# Patient Record
Sex: FEMALE | Race: BLACK OR AFRICAN AMERICAN | NOT HISPANIC OR LATINO
[De-identification: names, ages, dates, MRNs, and addresses within clinical notes are randomized per-mention and may not be internally consistent; named-entity substitution may affect disease eponyms.]

---

## 2019-09-26 VITALS
TEMPERATURE: 98 F | HEART RATE: 85 BPM | DIASTOLIC BLOOD PRESSURE: 60 MMHG | WEIGHT: 194.45 LBS | HEIGHT: 63 IN | RESPIRATION RATE: 16 BRPM | OXYGEN SATURATION: 100 % | SYSTOLIC BLOOD PRESSURE: 107 MMHG

## 2019-10-04 ENCOUNTER — APPOINTMENT (OUTPATIENT)
Dept: CARDIOLOGY | Facility: CLINIC | Age: 49
End: 2019-10-04
Payer: MEDICAID

## 2019-10-04 PROBLEM — Z00.00 ENCOUNTER FOR PREVENTIVE HEALTH EXAMINATION: Status: ACTIVE | Noted: 2019-10-04

## 2019-10-04 PROBLEM — D21.9 BENIGN NEOPLASM OF CONNECTIVE AND OTHER SOFT TISSUE, UNSPECIFIED: Chronic | Status: ACTIVE | Noted: 2019-08-22

## 2019-10-08 ENCOUNTER — NON-APPOINTMENT (OUTPATIENT)
Age: 49
End: 2019-10-08

## 2019-10-08 ENCOUNTER — APPOINTMENT (OUTPATIENT)
Dept: CARDIOLOGY | Facility: CLINIC | Age: 49
End: 2019-10-08
Payer: MEDICAID

## 2019-10-08 VITALS
SYSTOLIC BLOOD PRESSURE: 112 MMHG | HEIGHT: 63 IN | DIASTOLIC BLOOD PRESSURE: 76 MMHG | HEART RATE: 71 BPM | BODY MASS INDEX: 34.55 KG/M2 | WEIGHT: 195 LBS | OXYGEN SATURATION: 99 %

## 2019-10-08 DIAGNOSIS — R01.1 CARDIAC MURMUR, UNSPECIFIED: ICD-10-CM

## 2019-10-08 DIAGNOSIS — Z78.9 OTHER SPECIFIED HEALTH STATUS: ICD-10-CM

## 2019-10-08 DIAGNOSIS — R94.31 ABNORMAL ELECTROCARDIOGRAM [ECG] [EKG]: ICD-10-CM

## 2019-10-08 DIAGNOSIS — Z01.818 ENCOUNTER FOR OTHER PREPROCEDURAL EXAMINATION: ICD-10-CM

## 2019-10-08 PROCEDURE — 93000 ELECTROCARDIOGRAM COMPLETE: CPT

## 2019-10-08 PROCEDURE — 99204 OFFICE O/P NEW MOD 45 MIN: CPT

## 2019-10-08 PROCEDURE — 93306 TTE W/DOPPLER COMPLETE: CPT

## 2019-10-08 NOTE — PHYSICAL EXAM
[Normal Appearance] : normal appearance [General Appearance - Well Developed] : well developed [No Deformities] : no deformities [General Appearance - Well Nourished] : well nourished [Well Groomed] : well groomed [General Appearance - In No Acute Distress] : no acute distress [Normal Oral Mucosa] : normal oral mucosa [Normal Conjunctiva] : the conjunctiva exhibited no abnormalities [Eyelids - No Xanthelasma] : the eyelids demonstrated no xanthelasmas [No Oral Pallor] : no oral pallor [No Oral Cyanosis] : no oral cyanosis [Heart Rate And Rhythm] : heart rate and rhythm were normal [Edema] : no peripheral edema present [Arterial Pulses Normal] : the arterial pulses were normal [Heart Sounds] : normal S1 and S2 [Bowel Sounds] : normal bowel sounds [Respiration, Rhythm And Depth] : normal respiratory rhythm and effort [Auscultation Breath Sounds / Voice Sounds] : lungs were clear to auscultation bilaterally [Abdomen Tenderness] : non-tender [Abnormal Walk] : normal gait [Abdomen Soft] : soft [Cyanosis, Localized] : no localized cyanosis [Nail Clubbing] : no clubbing of the fingernails [Skin Color & Pigmentation] : normal skin color and pigmentation [Skin Turgor] : normal skin turgor [Impaired Insight] : insight and judgment were intact [] : no rash [Oriented To Time, Place, And Person] : oriented to person, place, and time [No Anxiety] : not feeling anxious [FreeTextEntry1] : 1/4 EDM in 2nd ICS

## 2019-10-08 NOTE — REASON FOR VISIT
[Follow-Up - Clinic] : a clinic follow-up of [FreeTextEntry1] : pre- op cardiac evaluation for uterine myomectomy.

## 2019-10-08 NOTE — DISCUSSION/SUMMARY
[FreeTextEntry1] : In a summary Aylin Nicole is a middle aged female with EKG done on 9/24/19 by PCP showing septal inversions, repeat EKG from today 10/8/19 is normal. Pre- op cardiac evaluation for uterine myomectomy and heart murmur, echo done showed normal LV systolic function and mild NV. Asymptomatic cardiac wise and normal LV systolic function, will be low to moderate risk cardiac wise for surgery. Risks and benefits explained to Ms. Bryan in detail.\par \par \par \par \par \par \par \par \par \par \par \par \par \par \par \par

## 2019-10-08 NOTE — HISTORY OF PRESENT ILLNESS
[FreeTextEntry1] : Aylin Nicole is a 49 year old female with no significant medical history referred for pre- op cardiac evaluation for uterine myomectomy and abnormal EKG. Denies any chest pain or palpitations. No shortness of breath on exertion. Exercise is limited by right knee pain since last year when she had an accident and Knee surgery. Does house hold things and walks around. and climbs stairs with out any cardiac symptoms.

## 2019-10-08 NOTE — REVIEW OF SYSTEMS
[Headache] : headache [Joint Pain] : joint pain [Negative] : Endocrine [Fever] : no fever [Chills] : no chills [Feeling Fatigued] : not feeling fatigued [Easy Bleeding] : no tendency for easy bleeding [Easy Bruising] : no tendency for easy bruising

## 2019-10-25 ENCOUNTER — RESULT REVIEW (OUTPATIENT)
Age: 49
End: 2019-10-25

## 2019-10-25 ENCOUNTER — INPATIENT (INPATIENT)
Facility: HOSPITAL | Age: 49
LOS: 1 days | Discharge: HOME | DRG: 743 | End: 2019-10-27
Attending: OBSTETRICS & GYNECOLOGY | Admitting: OBSTETRICS & GYNECOLOGY
Payer: MEDICAID

## 2019-10-25 DIAGNOSIS — Z98.890 OTHER SPECIFIED POSTPROCEDURAL STATES: Chronic | ICD-10-CM

## 2019-10-25 RX ORDER — ACETAMINOPHEN 500 MG
975 TABLET ORAL EVERY 6 HOURS
Refills: 0 | Status: DISCONTINUED | OUTPATIENT
Start: 2019-10-25 | End: 2019-10-27

## 2019-10-25 RX ORDER — OXYCODONE HYDROCHLORIDE 5 MG/1
10 TABLET ORAL EVERY 6 HOURS
Refills: 0 | Status: DISCONTINUED | OUTPATIENT
Start: 2019-10-25 | End: 2019-10-27

## 2019-10-25 RX ORDER — BUPIVACAINE 13.3 MG/ML
20 INJECTION, SUSPENSION, LIPOSOMAL INFILTRATION ONCE
Refills: 0 | Status: DISCONTINUED | OUTPATIENT
Start: 2019-10-25 | End: 2019-10-27

## 2019-10-25 RX ORDER — SODIUM CHLORIDE 9 MG/ML
1000 INJECTION, SOLUTION INTRAVENOUS
Refills: 0 | Status: DISCONTINUED | OUTPATIENT
Start: 2019-10-25 | End: 2019-10-25

## 2019-10-25 RX ORDER — METOCLOPRAMIDE HCL 10 MG
10 TABLET ORAL ONCE
Refills: 0 | Status: DISCONTINUED | OUTPATIENT
Start: 2019-10-25 | End: 2019-10-27

## 2019-10-25 RX ORDER — ONDANSETRON 8 MG/1
4 TABLET, FILM COATED ORAL ONCE
Refills: 0 | Status: COMPLETED | OUTPATIENT
Start: 2019-10-25 | End: 2019-10-25

## 2019-10-25 RX ORDER — SIMETHICONE 80 MG/1
80 TABLET, CHEWABLE ORAL THREE TIMES A DAY
Refills: 0 | Status: DISCONTINUED | OUTPATIENT
Start: 2019-10-25 | End: 2019-10-26

## 2019-10-25 RX ORDER — KETOROLAC TROMETHAMINE 30 MG/ML
30 SYRINGE (ML) INJECTION EVERY 6 HOURS
Refills: 0 | Status: DISCONTINUED | OUTPATIENT
Start: 2019-10-25 | End: 2019-10-27

## 2019-10-25 RX ORDER — OXYCODONE HYDROCHLORIDE 5 MG/1
5 TABLET ORAL EVERY 6 HOURS
Refills: 0 | Status: DISCONTINUED | OUTPATIENT
Start: 2019-10-25 | End: 2019-10-27

## 2019-10-25 RX ORDER — HYDROMORPHONE HYDROCHLORIDE 2 MG/ML
0.5 INJECTION INTRAMUSCULAR; INTRAVENOUS; SUBCUTANEOUS
Refills: 0 | Status: DISCONTINUED | OUTPATIENT
Start: 2019-10-25 | End: 2019-10-25

## 2019-10-25 RX ORDER — POLYETHYLENE GLYCOL 3350 17 G/17G
17 POWDER, FOR SOLUTION ORAL DAILY
Refills: 0 | Status: DISCONTINUED | OUTPATIENT
Start: 2019-10-25 | End: 2019-10-27

## 2019-10-25 RX ADMIN — HYDROMORPHONE HYDROCHLORIDE 0.5 MILLIGRAM(S): 2 INJECTION INTRAMUSCULAR; INTRAVENOUS; SUBCUTANEOUS at 18:10

## 2019-10-25 RX ADMIN — Medication 975 MILLIGRAM(S): at 21:43

## 2019-10-25 RX ADMIN — SIMETHICONE 80 MILLIGRAM(S): 80 TABLET, CHEWABLE ORAL at 21:43

## 2019-10-25 RX ADMIN — HYDROMORPHONE HYDROCHLORIDE 0.5 MILLIGRAM(S): 2 INJECTION INTRAMUSCULAR; INTRAVENOUS; SUBCUTANEOUS at 17:35

## 2019-10-25 RX ADMIN — Medication 975 MILLIGRAM(S): at 22:24

## 2019-10-25 RX ADMIN — Medication 30 MILLIGRAM(S): at 21:40

## 2019-10-25 RX ADMIN — OXYCODONE HYDROCHLORIDE 10 MILLIGRAM(S): 5 TABLET ORAL at 18:10

## 2019-10-25 RX ADMIN — Medication 30 MILLIGRAM(S): at 22:24

## 2019-10-25 RX ADMIN — ONDANSETRON 4 MILLIGRAM(S): 8 TABLET, FILM COATED ORAL at 23:30

## 2019-10-25 RX ADMIN — HYDROMORPHONE HYDROCHLORIDE 0.5 MILLIGRAM(S): 2 INJECTION INTRAMUSCULAR; INTRAVENOUS; SUBCUTANEOUS at 17:20

## 2019-10-25 NOTE — H&P ADULT - HISTORY OF PRESENT ILLNESS
49y G0 presenting for scheduled open abdominal myomectomy. Patient has had difficulty getting pregnant, wants a myomectomy for fertility. Reports having 5-6 fibroids.     OBHx: denies  H/x of cysts, fibroids, endometriosis: fibroids as above   STIs: denies h/x of HIV, RPR, Hepatitis, G/C, Trich  GYN Physician: Lopez   PMH: denies  PSH: denies  Meds: none  NKDA       T(C): --  HR: --  BP: --  RR: --  SpO2: --    PHYSICAL EXAM:   Gen: NAD  : EUA in OR pending       LABS:    H  Cr

## 2019-10-25 NOTE — BRIEF OPERATIVE NOTE - OPERATION/FINDINGS
normal sized mobile uterus on exam. 5-6 cm posterior intramural fibroid, 3 cm fundal subserosal fibroid as well as 3 smaller anterior intramural fibroids seen. All removed, 6 in total. uterine cavity entered with one of the anterior fibroids. Repaired with monocryl in multiple layers. Other sites closed with combination of stratofix and monocryl. Surgicell powder and interceed placed on incision sites.

## 2019-10-25 NOTE — H&P ADULT - ASSESSMENT
49y G0 presenting for scheduled open abdominal myomectomy  -admit to gyn for scheduled surgery  -NPO  -IVF   -anestehsia consult

## 2019-10-26 ENCOUNTER — TRANSCRIPTION ENCOUNTER (OUTPATIENT)
Age: 49
End: 2019-10-26

## 2019-10-26 LAB
ANION GAP SERPL CALC-SCNC: 8 MMOL/L — SIGNIFICANT CHANGE UP (ref 5–17)
BUN SERPL-MCNC: 7 MG/DL — SIGNIFICANT CHANGE UP (ref 7–23)
CALCIUM SERPL-MCNC: 8.6 MG/DL — SIGNIFICANT CHANGE UP (ref 8.4–10.5)
CHLORIDE SERPL-SCNC: 103 MMOL/L — SIGNIFICANT CHANGE UP (ref 96–108)
CO2 SERPL-SCNC: 24 MMOL/L — SIGNIFICANT CHANGE UP (ref 22–31)
CREAT SERPL-MCNC: 0.68 MG/DL — SIGNIFICANT CHANGE UP (ref 0.5–1.3)
GLUCOSE SERPL-MCNC: 126 MG/DL — HIGH (ref 70–99)
HCT VFR BLD CALC: 31.6 % — LOW (ref 34.5–45)
HGB BLD-MCNC: 10 G/DL — LOW (ref 11.5–15.5)
MCHC RBC-ENTMCNC: 29 PG — SIGNIFICANT CHANGE UP (ref 27–34)
MCHC RBC-ENTMCNC: 31.6 GM/DL — LOW (ref 32–36)
MCV RBC AUTO: 91.6 FL — SIGNIFICANT CHANGE UP (ref 80–100)
NRBC # BLD: 0 /100 WBCS — SIGNIFICANT CHANGE UP (ref 0–0)
PLATELET # BLD AUTO: 232 K/UL — SIGNIFICANT CHANGE UP (ref 150–400)
POTASSIUM SERPL-MCNC: 4.3 MMOL/L — SIGNIFICANT CHANGE UP (ref 3.5–5.3)
POTASSIUM SERPL-SCNC: 4.3 MMOL/L — SIGNIFICANT CHANGE UP (ref 3.5–5.3)
RBC # BLD: 3.45 M/UL — LOW (ref 3.8–5.2)
RBC # FLD: 13.2 % — SIGNIFICANT CHANGE UP (ref 10.3–14.5)
SODIUM SERPL-SCNC: 135 MMOL/L — SIGNIFICANT CHANGE UP (ref 135–145)
WBC # BLD: 12.67 K/UL — HIGH (ref 3.8–10.5)
WBC # FLD AUTO: 12.67 K/UL — HIGH (ref 3.8–10.5)

## 2019-10-26 RX ORDER — SIMETHICONE 80 MG/1
80 TABLET, CHEWABLE ORAL EVERY 8 HOURS
Refills: 0 | Status: DISCONTINUED | OUTPATIENT
Start: 2019-10-26 | End: 2019-10-27

## 2019-10-26 RX ORDER — ACETAMINOPHEN 500 MG
3 TABLET ORAL
Qty: 0 | Refills: 0 | DISCHARGE
Start: 2019-10-26

## 2019-10-26 RX ADMIN — SIMETHICONE 80 MILLIGRAM(S): 80 TABLET, CHEWABLE ORAL at 06:02

## 2019-10-26 RX ADMIN — POLYETHYLENE GLYCOL 3350 17 GRAM(S): 17 POWDER, FOR SOLUTION ORAL at 21:37

## 2019-10-26 RX ADMIN — Medication 30 MILLIGRAM(S): at 21:47

## 2019-10-26 RX ADMIN — Medication 975 MILLIGRAM(S): at 21:37

## 2019-10-26 RX ADMIN — Medication 975 MILLIGRAM(S): at 16:53

## 2019-10-26 RX ADMIN — Medication 30 MILLIGRAM(S): at 16:52

## 2019-10-26 RX ADMIN — Medication 30 MILLIGRAM(S): at 05:02

## 2019-10-26 RX ADMIN — Medication 30 MILLIGRAM(S): at 10:19

## 2019-10-26 RX ADMIN — Medication 975 MILLIGRAM(S): at 10:45

## 2019-10-26 RX ADMIN — Medication 30 MILLIGRAM(S): at 21:37

## 2019-10-26 RX ADMIN — SIMETHICONE 80 MILLIGRAM(S): 80 TABLET, CHEWABLE ORAL at 21:43

## 2019-10-26 RX ADMIN — Medication 975 MILLIGRAM(S): at 05:01

## 2019-10-26 RX ADMIN — SIMETHICONE 80 MILLIGRAM(S): 80 TABLET, CHEWABLE ORAL at 17:53

## 2019-10-26 RX ADMIN — SIMETHICONE 80 MILLIGRAM(S): 80 TABLET, CHEWABLE ORAL at 10:19

## 2019-10-26 RX ADMIN — Medication 30 MILLIGRAM(S): at 10:45

## 2019-10-26 RX ADMIN — Medication 975 MILLIGRAM(S): at 10:19

## 2019-10-26 RX ADMIN — Medication 975 MILLIGRAM(S): at 21:47

## 2019-10-26 NOTE — PROGRESS NOTE ADULT - SUBJECTIVE AND OBJECTIVE BOX
GYN PROGRESS NOTE    Patient evaluated at the bedside. No acute events. Patient would like to shower     Denies CP/SOB/dizziness/nausea/vomiting/abdominal pain/calf pain.    Pain well controlled on oral pain medications. Patient is ambulating independently, passing flatus and tolerating a regular diet.    O:   T(C): 37.1 (10-26-19 @ 08:51), Max: 37.1 (10-25-19 @ 17:00)  HR: 77 (10-26-19 @ 08:51) (73 - 88)  BP: 118/79 (10-26-19 @ 08:51) (106/68 - 135/63)  RR: 17 (10-26-19 @ 08:51) (11 - 100)  SpO2: 97% (10-26-19 @ 08:51) (95% - 99%)  Wt(kg): --    GEN: patient appears well  LUNGS: no respiratory distress  ABD: soft, mildly distended +BS, appropriately tender   Pelvic:   EXT: no calf tenderness        10-25 @ 07:01  -  10-26 @ 07:00  --------------------------------------------------------  IN: 250 mL / OUT: 2300 mL / NET: -2050 mL    10-26 @ 07:01  -  10-26 @ 12:39  --------------------------------------------------------  IN: 0 mL / OUT: 950 mL / NET: -950 mL        MEDICATIONS  (STANDING):  acetaminophen   Tablet .. 975 milliGRAM(s) Oral every 6 hours  BUpivacaine liposome 1.3% Injectable (no eMAR) 20 milliLiter(s) Local Injection once  ketorolac   Injectable 30 milliGRAM(s) IV Push every 6 hours  metoclopramide Injectable 10 milliGRAM(s) IV Push once  simethicone 80 milliGRAM(s) Chew every 8 hours    MEDICATIONS  (PRN):  oxyCODONE    IR 5 milliGRAM(s) Oral every 6 hours PRN Mild Pain (1 - 3)  oxyCODONE    IR 10 milliGRAM(s) Oral every 6 hours PRN Moderate Pain (4 - 6)  polyethylene glycol 3350 17 Gram(s) Oral daily PRN constiaption

## 2019-10-26 NOTE — PROGRESS NOTE ADULT - ASSESSMENT
A/P: 50yo s/p abdominal myomectomy for 6 fibroids and fertility, POD1  1. Vital signs stable, continue to monitor per protocol  2. Pain control: toradol/tylenol atc, oxy for BT  3. DVT prophylaxis: SCDs  4. CV: IVH   5. Pulm: Incentive spirometer (at least 10 times per hour while awake)   6. GI: Diet AAT  7. : Obrien to be d/c in AM  8. Follow up labs: AM Hb 10.0, stable  9. Activity: ambulate as tolerated  10. Dispo: when stable and meeting all post-op milestones

## 2019-10-26 NOTE — DISCHARGE NOTE PROVIDER - HOSPITAL COURSE
Patient admitted for post operative monitoring and pain control after scheduled abdominal myomectomy, stable and meeting post operative milestones. To be discharged with close outpatient followup.

## 2019-10-26 NOTE — DISCHARGE NOTE PROVIDER - NSDCFUADDINST_GEN_ALL_CORE_FT
- Nothing in vagina - no intercourse, tampons, or douching until cleared by your doctor.   - Avoid swimming, tub baths, strenuous activity and heavy lifting until cleared by your doctor.   - Showering is ok.   - Continue oral pain medications as needed for pain.    - Follow up in office in 2 weeks for your postoperative visit.  Call the office to confirm your follow up appointment.   - Call the office sooner if you develop any fever, heavy bleeding, or severe pain.  Go to the closest emergency room for any of these symptoms if you are not able to contact your doctor.

## 2019-10-26 NOTE — PROGRESS NOTE ADULT - SUBJECTIVE AND OBJECTIVE BOX
Ms. ARA SEGUNDO is a  49yFemale seen and examined today for her post operative care. No acute events reported. Pain well controlled. Adequate urine output.       T(C): 37.1 (10-26-19 @ 08:51), Max: 37.1 (10-25-19 @ 17:00)  HR: 77 (10-26-19 @ 08:51) (73 - 88)  BP: 118/79 (10-26-19 @ 08:51) (106/68 - 135/63)  RR: 17 (10-26-19 @ 08:51) (11 - 100)  SpO2: 97% (10-26-19 @ 08:51) (95% - 99%)  Wt(kg): --      10-25 @ 07:01  -  10-26 @ 07:00  --------------------------------------------------------  IN: 250 mL / OUT: 2300 mL / NET: -2050 mL    10-26 @ 07:01  -  10-26 @ 08:58  --------------------------------------------------------  IN: 0 mL / OUT: 500 mL / NET: -500 mL        Physical exam:  General: NAD  Lungs:cta ambrosio  Abdomen: soft nt  LE extremeties: SCDs in place  Incision:  c/d/i	        No Known Allergies      acetaminophen   Tablet .. 975 milliGRAM(s) Oral every 6 hours  BUpivacaine liposome 1.3% Injectable (no eMAR) 20 milliLiter(s) Local Injection once  ketorolac   Injectable 30 milliGRAM(s) IV Push every 6 hours  metoclopramide Injectable 10 milliGRAM(s) IV Push once  oxyCODONE    IR 5 milliGRAM(s) Oral every 6 hours PRN  oxyCODONE    IR 10 milliGRAM(s) Oral every 6 hours PRN  polyethylene glycol 3350 17 Gram(s) Oral daily PRN  simethicone 80 milliGRAM(s) Chew three times a day

## 2019-10-26 NOTE — DISCHARGE NOTE PROVIDER - CARE PROVIDER_API CALL
Alfredo Marroquin)  Obstetrics and Gynecology  [address] [place] [address], [place] [address]  Phone: [phone]  Fax: [phone]  Follow Up Time:

## 2019-10-26 NOTE — PROGRESS NOTE ADULT - ASSESSMENT
A/P: 48yo s/p abdominal myomectomy for 6 fibroids and fertility, POD1    1. Vital signs stable, continue to monitor per protocol  2. Pain control: toradol/tylenol atc, oxy for BT  3. DVT prophylaxis: SCDs  4. CV: IVH   5. Pulm: Incentive spirometer (at least 10 times per hour while awake)   6. GI: Diet AAT  7. : voudubg  8. Follow up labs: AM Hb 10.0, stable  9. Activity: ambulate as tolerated  10. Dispo: pending, will check back in @ 4 pm

## 2019-10-26 NOTE — PROGRESS NOTE ADULT - SUBJECTIVE AND OBJECTIVE BOX
Patient evaluated at bedside. She has moderate/heavy vaginal bleeding overnight though denies lightheadedness/dizziness. She reports pain is well controlled with medications. No further episodes of vomiting overnight. Tolerated jello.     T(C): 37 (10-26-19 @ 06:19), Max: 37.1 (10-26-19 @ 01:33)  HR: 73 (10-26-19 @ 06:19) (73 - 88)  BP: 106/68 (10-26-19 @ 06:19) (106/68 - 118/68)  RR: 18 (10-26-19 @ 06:19) (17 - 18)  SpO2: 97% (10-26-19 @ 06:19) (95% - 97%)    GA:  Resp: normal respiratory effort  Abd: +BS, soft, NT/ND, no rebound or guarding  Extrem: SCDs in place, no LE edema       10-25 @ 07:01  -  10-26 @ 06:39  --------------------------------------------------------  IN: 250 mL / OUT: 2300 mL / NET: -2050 mL                              10.0   12.67 )-----------( 232      ( 26 Oct 2019 05:42 )             31.6     10-26    135  |  103  |  7   ----------------------------<  126<H>  4.3   |  24  |  0.68    Ca    8.6      26 Oct 2019 05:42 Patient evaluated at bedside. She has moderate/heavy vaginal bleeding overnight though denies lightheadedness/dizziness. She reports pain is well controlled with medications. No further episodes of vomiting overnight. Tolerated jello. Ambulating, not yet passing flatus.    T(C): 37 (10-26-19 @ 06:19), Max: 37.1 (10-26-19 @ 01:33)  HR: 73 (10-26-19 @ 06:19) (73 - 88)  BP: 106/68 (10-26-19 @ 06:19) (106/68 - 118/68)  RR: 18 (10-26-19 @ 06:19) (17 - 18)  SpO2: 97% (10-26-19 @ 06:19) (95% - 97%)    GA:  Resp: normal respiratory effort  Abd: +BS, soft, NT/ND, no rebound or guarding  Extrem: SCDs in place, no LE edema       10-25 @ 07:01  -  10-26 @ 06:39  --------------------------------------------------------  IN: 250 mL / OUT: 2300 mL / NET: -2050 mL                              10.0   12.67 )-----------( 232      ( 26 Oct 2019 05:42 )             31.6     10-26    135  |  103  |  7   ----------------------------<  126<H>  4.3   |  24  |  0.68    Ca    8.6      26 Oct 2019 05:42

## 2019-10-26 NOTE — PROGRESS NOTE ADULT - SUBJECTIVE AND OBJECTIVE BOX
GYN POC    Pt seen and examined at bedside. Pt complains of mild abdominal pain. She had an episode of vomiting 1 hour ago after trying to drink some water, will continue with only ice sips for now. She is ambulating.  Pt denies any fever, chills, chest pain, SOB.    T(F): 98.4 (10-25-19 @ 20:49), Max: 98.7 (10-25-19 @ 17:00)  HR: 88 (10-25-19 @ 20:49) (74 - 88)  BP: 118/68 (10-25-19 @ 20:49) (116/76 - 135/63)  RR: 18 (10-25-19 @ 20:49) (11 - 100)  SpO2: 95% (10-25-19 @ 20:49) (95% - 99%)  Wt(kg): --    10-25 @ 07:01  -  10-26 @ 00:36  --------------------------------------------------------  IN: 250 mL / OUT: 350 mL / NET: -100 mL        acetaminophen   Tablet .. 975 milliGRAM(s) Oral every 6 hours  BUpivacaine liposome 1.3% Injectable (no eMAR) 20 milliLiter(s) Local Injection once  ketorolac   Injectable 30 milliGRAM(s) IV Push every 6 hours  metoclopramide Injectable 10 milliGRAM(s) IV Push once  oxyCODONE    IR 5 milliGRAM(s) Oral every 6 hours PRN Mild Pain (1 - 3)  oxyCODONE    IR 10 milliGRAM(s) Oral every 6 hours PRN Moderate Pain (4 - 6)  polyethylene glycol 3350 17 Gram(s) Oral daily PRN constiaption  simethicone 80 milliGRAM(s) Chew three times a day      Physical exam:  Constitutional: NAD  Pulmonary: regular respiratory effort  Abdomen: pressure dressing in place. Soft, mildly tender, nondistended  Extremities: no lower extremity edema, or calf tenderness. SCDs in place

## 2019-10-26 NOTE — PROGRESS NOTE ADULT - ASSESSMENT
A/P: 48yo s/p abdominal myomectomy for 6 fibroids and fertility, POD1  1. Vital signs stable, continue to monitor per protocol  2. Pain control: toradol/tylenol atc, oxy for BT  3. DVT prophylaxis: SCDs  4. CV: IVH   5. Pulm: Incentive spirometer (at least 10 times per hour while awake)   6. GI: Diet AAT  7. : Obrien to be d/c in AM  8. Follow up labs: AM CBC  9. Activity: bedrest tonight

## 2019-10-27 ENCOUNTER — TRANSCRIPTION ENCOUNTER (OUTPATIENT)
Age: 49
End: 2019-10-27

## 2019-10-27 VITALS
DIASTOLIC BLOOD PRESSURE: 75 MMHG | OXYGEN SATURATION: 98 % | RESPIRATION RATE: 17 BRPM | TEMPERATURE: 98 F | HEART RATE: 69 BPM | SYSTOLIC BLOOD PRESSURE: 112 MMHG

## 2019-10-27 RX ADMIN — Medication 975 MILLIGRAM(S): at 10:15

## 2019-10-27 RX ADMIN — Medication 975 MILLIGRAM(S): at 03:26

## 2019-10-27 RX ADMIN — Medication 975 MILLIGRAM(S): at 09:33

## 2019-10-27 RX ADMIN — Medication 30 MILLIGRAM(S): at 03:26

## 2019-10-27 RX ADMIN — SIMETHICONE 80 MILLIGRAM(S): 80 TABLET, CHEWABLE ORAL at 05:12

## 2019-10-27 RX ADMIN — Medication 975 MILLIGRAM(S): at 03:25

## 2019-10-27 RX ADMIN — Medication 30 MILLIGRAM(S): at 03:25

## 2019-10-27 NOTE — PROGRESS NOTE ADULT - REASON FOR ADMISSION
scheduled open surgery

## 2019-10-27 NOTE — PROGRESS NOTE ADULT - ASSESSMENT
A/P: 48yo s/p abdominal myomectomy for 6 fibroids and fertility, POD2    1. Vital signs stable, continue to monitor per protocol  2. Pain control: toradol/tylenol atc, oxy for BT  3. DVT prophylaxis: SCDs  4. CV: IVH   5. Pulm: Incentive spirometer (at least 10 times per hour while awake)   6. GI: Diet AAT  7. : voiding   8. Follow up labs: AM Hb 10.0, stable  9. Activity: ambulate as tolerated  10. Dispo: POD 2

## 2019-10-27 NOTE — PROGRESS NOTE ADULT - SUBJECTIVE AND OBJECTIVE BOX
GYN PROGRESS NOTE    Patient evaluated at the bedside. No acute events.  Denies CP/SOB/dizziness/nausea/vomiting/abdominal pain/calf pain.  Pain well controlled on oral pain medications. Patient is ambulating independently, passing flatus and tolerating a regular diet.    O:   T(C): 37.1 (10-26-19 @ 17:58), Max: 37.1 (10-26-19 @ 08:51)  HR: 84 (10-26-19 @ 17:58) (73 - 84)  BP: 103/67 (10-26-19 @ 17:58) (103/67 - 118/79)  RR: 16 (10-26-19 @ 17:58) (16 - 18)  SpO2: 98% (10-26-19 @ 17:58) (97% - 98%)  Wt(kg): --    GEN: patient appears well  LUNGS: no respiratory distress  ABD: soft mildly tender to palpation  EXT: no calf tenderness        10-25 @ 07:01  -  10-26 @ 07:00  --------------------------------------------------------  IN: 250 mL / OUT: 2300 mL / NET: -2050 mL    10-26 @ 07:01  -  10-27 @ 05:19  --------------------------------------------------------  IN: 0 mL / OUT: 950 mL / NET: -950 mL      MEDICATIONS  (STANDING):  acetaminophen   Tablet .. 975 milliGRAM(s) Oral every 6 hours  BUpivacaine liposome 1.3% Injectable (no eMAR) 20 milliLiter(s) Local Injection once  ketorolac   Injectable 30 milliGRAM(s) IV Push every 6 hours  metoclopramide Injectable 10 milliGRAM(s) IV Push once  simethicone 80 milliGRAM(s) Chew every 8 hours    MEDICATIONS  (PRN):  oxyCODONE    IR 5 milliGRAM(s) Oral every 6 hours PRN Mild Pain (1 - 3)  oxyCODONE    IR 10 milliGRAM(s) Oral every 6 hours PRN Moderate Pain (4 - 6)  polyethylene glycol 3350 17 Gram(s) Oral daily PRN constiaption

## 2019-10-27 NOTE — DISCHARGE NOTE NURSING/CASE MANAGEMENT/SOCIAL WORK - PATIENT PORTAL LINK FT
You can access the FollowMyHealth Patient Portal offered by [facility] by registering at the following website: [email or web address]. By joining [employer]’s FollowMyHealth portal, you will also be able to view your health information using other applications (apps) compatible with our system.

## 2019-10-31 LAB — SURGICAL PATHOLOGY STUDY: SIGNIFICANT CHANGE UP

## 2019-11-04 DIAGNOSIS — D25.1 INTRAMURAL LEIOMYOMA OF UTERUS: ICD-10-CM

## 2019-11-04 DIAGNOSIS — D25.0 SUBMUCOUS LEIOMYOMA OF UTERUS: ICD-10-CM

## 2019-11-04 DIAGNOSIS — D25.2 SUBSEROSAL LEIOMYOMA OF UTERUS: ICD-10-CM

## 2019-11-04 DIAGNOSIS — D25.9 LEIOMYOMA OF UTERUS, UNSPECIFIED: ICD-10-CM

## 2019-11-06 PROCEDURE — 80048 BASIC METABOLIC PNL TOTAL CA: CPT

## 2019-11-06 PROCEDURE — 86900 BLOOD TYPING SEROLOGIC ABO: CPT

## 2019-11-06 PROCEDURE — 86901 BLOOD TYPING SEROLOGIC RH(D): CPT

## 2019-11-06 PROCEDURE — 36415 COLL VENOUS BLD VENIPUNCTURE: CPT

## 2019-11-06 PROCEDURE — 86850 RBC ANTIBODY SCREEN: CPT

## 2019-11-06 PROCEDURE — 85027 COMPLETE CBC AUTOMATED: CPT

## 2019-11-06 PROCEDURE — 88305 TISSUE EXAM BY PATHOLOGIST: CPT

## 2020-09-24 NOTE — BRIEF OPERATIVE NOTE - TYPE OF ANESTHESIA
Spoke with pt and she said she would still like to see someone regarding the possibility of having diverticulitis as discussed.  Let pt know that once referral was sent someone would be contacting her for an appt.  Thanks.   [facility]
